# Patient Record
Sex: FEMALE | Race: WHITE | NOT HISPANIC OR LATINO | Employment: OTHER | URBAN - METROPOLITAN AREA
[De-identification: names, ages, dates, MRNs, and addresses within clinical notes are randomized per-mention and may not be internally consistent; named-entity substitution may affect disease eponyms.]

---

## 2023-08-16 ENCOUNTER — PATIENT OUTREACH (OUTPATIENT)
Dept: CARE COORDINATION | Facility: CLINIC | Age: 83
End: 2023-08-16
Payer: COMMERCIAL

## 2023-11-06 DIAGNOSIS — G25.0 ESSENTIAL TREMOR: Primary | ICD-10-CM

## 2023-11-06 RX ORDER — PRIMIDONE 50 MG/1
25 TABLET ORAL 2 TIMES DAILY
COMMUNITY
Start: 2023-10-06 | End: 2023-11-06 | Stop reason: SDUPTHER

## 2023-11-06 RX ORDER — PRIMIDONE 50 MG/1
25 TABLET ORAL 2 TIMES DAILY
Qty: 30 TABLET | Refills: 5 | Status: SHIPPED | OUTPATIENT
Start: 2023-11-06 | End: 2024-05-08 | Stop reason: SDUPTHER

## 2023-11-19 NOTE — PROGRESS NOTES
[facility]     Subjective     Jenise Castorena is an 83 y.o. year old right-handed woman with parkinsonism (PD vs. medication-induced parkinsonism vs vascular parkinsonism vs mixed) who presents for follow up. She is accompanied by Rosaline [phone]), /[place] .     Interval Hx: Last seen 5/10/23. Plan was to increase Sinemet to 2-2.5 tabs QID.      She is currently taking 2.5 tabs TID. Tolerating without side effects, no dyskinesia. She endorses significant improvement of tardive dyskinesia since last visit (when Artane was stopped), it rarely happens now.     Patient and  notice that her tremor, bradykinesia and gait is better on the current dose. She is able to walk further and has less FOG. Still continues to get home PT twice a week.     Fell once in August, had CT head that was negative but she needed stitches    No urinary incontinence  No constipation, takes laxative  No dysphagia; eating well   No OH    Patient's main complaint today is insomnia. She has trouble falling asleep but this has been chronic since before parkinsonism. She sees a psychiatrist who prescribed mirtazapine and she has follow up soon.     Social Hx:   Lives at home alone, had an aid who visits 2-3 times per day  Son visits her twice a week    HPI (per chart review):  Initially presented to movement clinic for parkinsonism. Initially patient of Dr. Parr in 11/2023 for LUE tremor/weakness and frequent falls/gait instability. Initially thought LUE weakness was from 2020 fall with left wrist fracture. Initially tried C/L TID without improvement; switched to Primidone. However, after stopping C/L she felt weaker and slower overall and was restarted on C/L, Primidone and Pramipexole was started.     Review of Systems  All other system have been reviewed and are negative for complaint.    There is no problem list on file for this patient.    No past medical history on  file.    Current Outpatient Medications:     cephalexin (Keflex) 500 mg capsule, TAKE 1 CAPSULE BY MOUTH TWO TIMES A DAY, Disp: 14 capsule, Rfl: 0    naproxen (Naprosyn) 250 mg tablet, TAKE 1 TABLET BY MOUTH TWO TIMES A DAY, Disp: 14 tablet, Rfl: 0    primidone (Mysoline) 50 mg tablet, Take 0.5 tablets (25 mg) by mouth 2 times a day., Disp: 30 tablet, Rfl: 5  Not on File    There were no vitals taken for this visit.     Objective   Physical Exam  Alert, oriented to self, year, location  EOM full range horizontally and vertically, smile symmetric  Strength 5/5 in all extremities distally and proximally except left wrist and fingers (extensor contraction)  Gait: stooped posture, narrow based gait with mild en bloc turn. Positive pull test, 3 steps back.     GAIT: See MDS-UPDRS motor examination  MDS UPDRS 1st Score: Motor Examination  Is the patient on medication for treating the symptoms of Parkinson's Disease?: Yes  Patients receiving medication for treating the symptoms of Parkinson's Disease, vikas the patient's clinical state.: On  Is the patient on Levodopa?: Yes  Minutes since last Levodopa dose: 3.5 hours ago  Speech: 0  Facial Expression: 1  Rigidty Neck: 1  Rigidty RUE: 1  Rigidity - LUE: 0  Rigidity RLE: 3  Rigidity LLE: 3  Finger Tapping Right Hand: 2  Finger Tapping Left Hand: 4  Hand Movements- Right Hand: 1  Hand Movements- Left Hand: 4  Pronatiaon-Supination Movments - Right Hand: 1  Pronatiaon-Supination Movments Left Hand: 3  Toe Tapping Right Foot: 1  Toe Tapping - Left Foot: 1  Leg Agility - Right Le  Leg Agility - Left le  Arising from Chair: 4  Gait: 2  Freezing of Gait: 1  Postural Stability: 1  Posture: 2  Global Spontanteity of Movment ( Body Bradykinesia): 1  Postural Tremor - Right Hand: 1  Postural Tremor - Left hand: 2  Kinetic Tremor - Right hand: 1  Kinetic Tremor - Left hand: 3  Rest Tremor Amplitude - RUE: 0  Rest Tremor Amplitude - LUE: 3  Rest Tremor Amplitude - RLE: 0  Rest  Tremor Amplitude - LLE: 0  Rest Tremor Amplitude - Lip/Jaw: 1  Constancy of Rest Tremor: 1  MDS UPDRS Total Score: 52  Were dyskinesias (chorea or dystonia) present during examination?: Yes  Did these movements interfere with your rating?: No      Assessment/Plan   This pleasant 83 y.o. year old female presents for follow up of parkinsonism (PD vs. medication-induced parkinsonism vs vascular parkinsonism vs mixed). Exam today shows some improvement in bradykinesia, postural stability and gait. Intermittent orobuccal dyskinesia today but patient says it is much improved since last visit. She has not had Catarino scan before, but Carbidopa-Levodopa seems to be helping her, suggesting possible iPD. MRI brain from 2022 reviewed: she has some white matter disease but no basal ganglia lesions. At this time, patient would like to try higher doses of medication.     Plan:  -Increase Carbidopa-Levodopa 2.5 tablets 4 times a day for one week, then increase to 3 tablets 4 times a day  -Take every 3-3.5 hours, avoid close to bedtime  -Follow up PCP or psychiatrist for insomnia    Return to clinic in 4-6 months

## 2023-11-20 RX ORDER — ACETAMINOPHEN AND CODEINE PHOSPHATE 300; 30 MG/1; MG/1
TABLET ORAL
COMMUNITY
Start: 2020-06-16

## 2023-11-20 RX ORDER — VENLAFAXINE HYDROCHLORIDE 150 MG/1
150 CAPSULE, EXTENDED RELEASE ORAL DAILY
COMMUNITY

## 2023-11-20 RX ORDER — LOSARTAN POTASSIUM AND HYDROCHLOROTHIAZIDE 25; 100 MG/1; MG/1
TABLET ORAL
COMMUNITY
Start: 2020-05-06

## 2023-11-20 RX ORDER — ATORVASTATIN CALCIUM 40 MG/1
40 TABLET, FILM COATED ORAL DAILY
COMMUNITY

## 2023-11-20 RX ORDER — CEFDINIR 300 MG/1
CAPSULE ORAL
COMMUNITY
Start: 2023-03-16

## 2023-11-20 RX ORDER — TRAZODONE HYDROCHLORIDE 50 MG/1
50-100 TABLET ORAL NIGHTLY PRN
COMMUNITY

## 2023-11-20 RX ORDER — LYSINE HCL 500 MG
1 TABLET ORAL 2 TIMES DAILY
COMMUNITY
Start: 2019-11-11

## 2023-11-20 RX ORDER — CLONIDINE 0.3 MG/24H
PATCH, EXTENDED RELEASE TRANSDERMAL
COMMUNITY

## 2023-11-20 RX ORDER — LOSARTAN POTASSIUM 50 MG/1
TABLET ORAL
COMMUNITY
Start: 2020-07-14

## 2023-11-20 RX ORDER — PREGABALIN 50 MG/1
1 CAPSULE ORAL 3 TIMES DAILY
COMMUNITY
Start: 2016-05-24

## 2023-11-20 RX ORDER — LEVOTHYROXINE SODIUM 75 UG/1
75 TABLET ORAL DAILY
COMMUNITY
Start: 2023-10-09

## 2023-11-20 RX ORDER — NITROFURANTOIN 25; 75 MG/1; MG/1
CAPSULE ORAL
COMMUNITY
Start: 2021-03-20

## 2023-11-20 RX ORDER — PRAMIPEXOLE DIHYDROCHLORIDE 0.12 MG/1
0.12 TABLET ORAL NIGHTLY
COMMUNITY
End: 2024-05-21

## 2023-11-20 RX ORDER — POLYVINYL ALCOHOL 14 MG/ML
SOLUTION/ DROPS OPHTHALMIC
COMMUNITY
Start: 2020-01-31

## 2023-11-20 RX ORDER — ACETAMINOPHEN 500 MG
TABLET ORAL
COMMUNITY

## 2023-11-20 RX ORDER — HYDROCHLOROTHIAZIDE 25 MG/1
25 TABLET ORAL DAILY
COMMUNITY

## 2023-11-20 RX ORDER — CHLORHEXIDINE GLUCONATE ORAL RINSE 1.2 MG/ML
SOLUTION DENTAL
COMMUNITY
Start: 2023-10-30

## 2023-11-20 RX ORDER — ESOMEPRAZOLE MAGNESIUM 40 MG/1
1 CAPSULE, DELAYED RELEASE ORAL DAILY
COMMUNITY
Start: 2016-02-22

## 2023-11-20 RX ORDER — ARIPIPRAZOLE 5 MG/1
1 TABLET ORAL DAILY
COMMUNITY
Start: 2016-05-23

## 2023-11-20 RX ORDER — MIRTAZAPINE 7.5 MG/1
7.5 TABLET, FILM COATED ORAL NIGHTLY
COMMUNITY
Start: 2023-10-17

## 2023-11-20 RX ORDER — ARIPIPRAZOLE 10 MG/1
TABLET ORAL
COMMUNITY
Start: 2020-07-14

## 2023-11-20 RX ORDER — ALBUTEROL SULFATE 90 UG/1
AEROSOL, METERED RESPIRATORY (INHALATION)
COMMUNITY
Start: 2023-02-17

## 2023-11-20 RX ORDER — IBANDRONATE SODIUM 150 MG/1
TABLET, FILM COATED ORAL
COMMUNITY

## 2023-11-20 RX ORDER — ASPIRIN 325 MG
TABLET, DELAYED RELEASE (ENTERIC COATED) ORAL
COMMUNITY
Start: 2022-12-01

## 2023-11-20 RX ORDER — QUETIAPINE FUMARATE 25 MG/1
TABLET, FILM COATED ORAL
COMMUNITY
Start: 2023-10-13

## 2023-11-20 RX ORDER — BENZONATATE 100 MG/1
100 CAPSULE ORAL 3 TIMES DAILY PRN
COMMUNITY
Start: 2023-09-27

## 2023-11-20 RX ORDER — LEVOTHYROXINE SODIUM 50 UG/1
50 TABLET ORAL DAILY
COMMUNITY

## 2023-11-20 RX ORDER — TRAMADOL HYDROCHLORIDE 50 MG/1
TABLET ORAL EVERY 8 HOURS
COMMUNITY
Start: 2016-05-24

## 2023-11-20 RX ORDER — MECLIZINE HCL 12.5 MG 12.5 MG/1
12.5 TABLET ORAL 3 TIMES DAILY
COMMUNITY
Start: 2023-01-25

## 2023-11-20 RX ORDER — DOCUSATE SODIUM 100 MG/1
CAPSULE, LIQUID FILLED ORAL
COMMUNITY
Start: 2021-04-06

## 2023-11-20 RX ORDER — MIRTAZAPINE 15 MG/1
15 TABLET, ORALLY DISINTEGRATING ORAL NIGHTLY
COMMUNITY
Start: 2023-10-13

## 2023-11-20 RX ORDER — TERBINAFINE HYDROCHLORIDE 250 MG/1
1 TABLET ORAL DAILY
COMMUNITY
Start: 2016-03-08

## 2023-11-20 RX ORDER — DICLOFENAC SODIUM 10 MG/G
GEL TOPICAL
COMMUNITY
Start: 2023-06-03

## 2023-11-20 RX ORDER — TRIHEXYPHENIDYL HYDROCHLORIDE 2 MG/1
TABLET ORAL
COMMUNITY

## 2023-11-20 RX ORDER — CARBIDOPA AND LEVODOPA 25; 100 MG/1; MG/1
TABLET ORAL
COMMUNITY
End: 2023-11-30 | Stop reason: SDUPTHER

## 2023-11-20 RX ORDER — SYRINGE-NEEDLE,INSULIN,0.5 ML 28GX1/2"
600 SYRINGE, EMPTY DISPOSABLE MISCELLANEOUS 2 TIMES DAILY
COMMUNITY
Start: 2023-08-23

## 2023-11-20 RX ORDER — ZOLPIDEM TARTRATE 10 MG/1
TABLET ORAL
COMMUNITY
Start: 2021-03-25

## 2023-11-20 RX ORDER — LANOLIN ALCOHOL/MO/W.PET/CERES
1 CREAM (GRAM) TOPICAL 2 TIMES DAILY
COMMUNITY

## 2023-11-20 RX ORDER — LABETALOL 300 MG/1
1 TABLET, FILM COATED ORAL 2 TIMES DAILY
COMMUNITY

## 2023-11-20 RX ORDER — ACETAMINOPHEN 500 MG
500 TABLET ORAL EVERY 8 HOURS PRN
COMMUNITY
Start: 2023-04-04

## 2023-11-20 RX ORDER — POLYVINYL ALCOHOL, POVIDONE .6; .5 G/100ML; G/100ML
SOLUTION/ DROPS INTRAOCULAR
COMMUNITY
Start: 2023-10-06

## 2023-11-20 RX ORDER — GABAPENTIN 100 MG/1
100 CAPSULE ORAL 3 TIMES DAILY
COMMUNITY

## 2023-11-20 RX ORDER — LIDOCAINE 50 MG/G
PATCH TOPICAL
COMMUNITY

## 2023-11-20 RX ORDER — OLMESARTAN MEDOXOMIL AND HYDROCHLOROTHIAZIDE 40/25 40; 25 MG/1; MG/1
1 TABLET ORAL DAILY
COMMUNITY
Start: 2016-02-22

## 2023-11-20 RX ORDER — DOCUSATE SODIUM AND SENNOSIDES 50; 8.6 MG/1; MG/1
1 TABLET ORAL 2 TIMES DAILY
COMMUNITY
Start: 2023-10-30

## 2023-11-20 RX ORDER — VALSARTAN 320 MG/1
320 TABLET ORAL DAILY
COMMUNITY

## 2023-11-21 ENCOUNTER — OFFICE VISIT (OUTPATIENT)
Dept: NEUROLOGY | Facility: CLINIC | Age: 83
End: 2023-11-21
Payer: COMMERCIAL

## 2023-11-21 VITALS — DIASTOLIC BLOOD PRESSURE: 66 MMHG | RESPIRATION RATE: 18 BRPM | SYSTOLIC BLOOD PRESSURE: 110 MMHG | HEART RATE: 70 BPM

## 2023-11-21 DIAGNOSIS — G20.C PARKINSONISM, UNSPECIFIED PARKINSONISM TYPE (MULTI): Primary | ICD-10-CM

## 2023-11-21 PROCEDURE — 99214 OFFICE O/P EST MOD 30 MIN: CPT | Performed by: PSYCHIATRY & NEUROLOGY

## 2023-11-21 PROCEDURE — 1036F TOBACCO NON-USER: CPT | Performed by: PSYCHIATRY & NEUROLOGY

## 2023-11-21 PROCEDURE — 1125F AMNT PAIN NOTED PAIN PRSNT: CPT | Performed by: PSYCHIATRY & NEUROLOGY

## 2023-11-21 PROCEDURE — 1160F RVW MEDS BY RX/DR IN RCRD: CPT | Performed by: PSYCHIATRY & NEUROLOGY

## 2023-11-21 PROCEDURE — 1159F MED LIST DOCD IN RCRD: CPT | Performed by: PSYCHIATRY & NEUROLOGY

## 2023-11-21 RX ORDER — AMMONIUM LACTATE 12 G/100G
CREAM TOPICAL
COMMUNITY
Start: 2023-06-02

## 2023-11-21 RX ORDER — SENNOSIDES 8.6 MG/1
8.6 TABLET ORAL AS NEEDED
COMMUNITY
Start: 2023-08-16

## 2023-11-21 RX ORDER — POLYETHYLENE GLYCOL 3350 17 G/17G
POWDER, FOR SOLUTION ORAL
COMMUNITY
Start: 2021-12-27

## 2023-11-21 RX ORDER — CARBOXYMETHYLCELLULOSE SODIUM 5 MG/ML
1 SOLUTION/ DROPS OPHTHALMIC 4 TIMES DAILY
COMMUNITY
Start: 2020-01-31

## 2023-11-21 ASSESSMENT — ENCOUNTER SYMPTOMS
DEPRESSION: 0
OCCASIONAL FEELINGS OF UNSTEADINESS: 0
LOSS OF SENSATION IN FEET: 0

## 2023-11-21 ASSESSMENT — UNIFIED PARKINSONS DISEASE RATING SCALE (UPDRS)
PRONATION_SUPINATION_LEFT: 3
GAIT: 2
CLINICAL_STATE: ON
MOVEMENTS_INTERFERE_WITH_RATINGS: NO
RIGIDITY_RUE: 1
LEVODOPA: YES
AMPLITUDE_LUE: 3
HANDMOVEMENTS_RIGHT: 1
AMPLITUDE_LIP_JAW: 1
LEG_AGILITY_RIGHT: 1
TOETAPPING_RIGHT: 1
PRONATION_SUPINATION_RIGHT: 1
CONSTANCY_TREMOR_ATREST: 1
TOTAL_SCORE: 52
FINGER_TAPPING_LEFT: 4
KINETIC_TREMOR_LEFTHAND: 3
AMPLITUDE_RLE: 0
CHAIR_RISING_SCALE: 4
POSTURAL_TREMOR_RIGHTHAND: 1
AMPLITUDE_RUE: 0
POSTURAL_STABILITY: 1
RIGIDITY_LUE: 0
DYSKINESIAS_PRESENT: YES
POSTURE: 2
RIGIDITY_LLE: 3
SPEECH: 0
FINGER_TAPPING_RIGHT: 2
RIGIDITY_NECK: 1
POSTURAL_TREMOR_LEFTHAND: 2
PARKINSONS_MEDS: YES
FREEZING_GAIT: 1
LEG_AGILITY_LEFT: 2
KINETIC_TREMOR_RIGHTHAND: 1
TOETAPPING_LEFT: 1
RIGIDITY_RLE: 3
SPONTANEITY_OF_MOVEMENT: 1
FACIAL_EXPRESSION: 1
AMPLITUDE_LLE: 0

## 2023-11-21 ASSESSMENT — PATIENT HEALTH QUESTIONNAIRE - PHQ9
1. LITTLE INTEREST OR PLEASURE IN DOING THINGS: NOT AT ALL
2. FEELING DOWN, DEPRESSED OR HOPELESS: NOT AT ALL
SUM OF ALL RESPONSES TO PHQ9 QUESTIONS 1 AND 2: 0

## 2023-11-21 NOTE — PATIENT INSTRUCTIONS
Dear Romelia Castorena,    You were seen by Dr. Bond for parkinsonism. Since the Carbidopa-Levodopa is helping you but you feel you can use more medication, we will increase the amount of Carbidopa-Levodopa you take.    Please take Carbidopa-Levodopa 2.5 tablets 4 times a day for one week, then increase to 3 tablets 4 times a day.  We recommend taking the meds every 3-3.5 hours; for example: [id number].   Try to avoid taking the last dose right before bed as it could contribute to insomnia.   We recommend you work with your PCP or psychiatrist for insomnia.     Follow up in 6 months  Please call is if there are any questions about dose adjustment

## 2023-11-29 DIAGNOSIS — G20.A1 PARKINSON'S DISEASE, UNSPECIFIED WHETHER DYSKINESIA PRESENT, UNSPECIFIED WHETHER MANIFESTATIONS FLUCTUATE (MULTI): Primary | ICD-10-CM

## 2023-11-29 NOTE — TELEPHONE ENCOUNTER
Call from Home care RN Bina [phone] calls asking for clarification on C/L increase you ordered. The home care nurse says that the patient was only on 2 tabs 3x daily and she feels the patietn cannot jump to 2.5 pills 4x/d and then 3 pill 4x/d in just 2 weeks. She also says that it is physically impossible to get her 4x/d dosing as staff is not available q3-3.5 hours. Please contact this RN at [phone] as she was quite forceful in how she felt the meds should be taken and wanted an alternate plan that fit their scheduling. You should probably designate the times you want the meds given in the RX so they cannot change the dose schedule?

## 2023-11-30 DIAGNOSIS — G20.A1 PARKINSON'S DISEASE, UNSPECIFIED WHETHER DYSKINESIA PRESENT, UNSPECIFIED WHETHER MANIFESTATIONS FLUCTUATE (MULTI): ICD-10-CM

## 2023-11-30 RX ORDER — CARBIDOPA AND LEVODOPA 25; 100 MG/1; MG/1
3 TABLET ORAL 3 TIMES DAILY
Qty: 810 TABLET | Refills: 3 | Status: SHIPPED | OUTPATIENT
Start: 2023-11-30 | End: 2024-11-29

## 2023-11-30 RX ORDER — CARBIDOPA AND LEVODOPA 25; 100 MG/1; MG/1
3 TABLET ORAL 3 TIMES DAILY
Qty: 810 TABLET | Refills: 3 | Status: SHIPPED | OUTPATIENT
Start: 2023-11-30 | End: 2023-11-30 | Stop reason: SDUPTHER

## 2023-11-30 NOTE — TELEPHONE ENCOUNTER
I called the home care RN and passed along recommendation from Dr Bond and put in new refill for MD to signoff.

## 2023-12-06 ENCOUNTER — APPOINTMENT (OUTPATIENT)
Dept: NEUROLOGY | Facility: CLINIC | Age: 83
End: 2023-12-06
Payer: COMMERCIAL

## 2023-12-12 ENCOUNTER — TELEPHONE (OUTPATIENT)
Dept: NEUROLOGY | Facility: CLINIC | Age: 83
End: 2023-12-12
Payer: COMMERCIAL

## 2023-12-12 NOTE — TELEPHONE ENCOUNTER
Pt  home care nurse weekly update calls to say that the pt was unable to tolerate increased C/L- she passed out and fell onSaturday - did not seek urgent care. They are changinging her dose back to 2 tabs tid.

## 2024-05-08 DIAGNOSIS — G25.0 ESSENTIAL TREMOR: ICD-10-CM

## 2024-05-08 RX ORDER — PRIMIDONE 50 MG/1
25 TABLET ORAL 2 TIMES DAILY
Qty: 30 TABLET | Refills: 5 | Status: SHIPPED | OUTPATIENT
Start: 2024-05-08 | End: 2024-05-21 | Stop reason: ALTCHOICE

## 2024-05-21 ENCOUNTER — OFFICE VISIT (OUTPATIENT)
Dept: NEUROLOGY | Facility: CLINIC | Age: 84
End: 2024-05-21
Payer: COMMERCIAL

## 2024-05-21 VITALS
HEIGHT: 62 IN | SYSTOLIC BLOOD PRESSURE: 131 MMHG | DIASTOLIC BLOOD PRESSURE: 84 MMHG | HEART RATE: 58 BPM | RESPIRATION RATE: 16 BRPM | WEIGHT: 160 LBS | BODY MASS INDEX: 29.44 KG/M2

## 2024-05-21 DIAGNOSIS — G20.C PARKINSONISM, UNSPECIFIED PARKINSONISM TYPE (MULTI): Primary | ICD-10-CM

## 2024-05-21 PROCEDURE — 99212 OFFICE O/P EST SF 10 MIN: CPT | Performed by: NURSE PRACTITIONER

## 2024-05-21 PROCEDURE — 1160F RVW MEDS BY RX/DR IN RCRD: CPT | Performed by: NURSE PRACTITIONER

## 2024-05-21 PROCEDURE — 1159F MED LIST DOCD IN RCRD: CPT | Performed by: NURSE PRACTITIONER

## 2024-05-21 PROCEDURE — 1125F AMNT PAIN NOTED PAIN PRSNT: CPT | Performed by: NURSE PRACTITIONER

## 2024-05-21 PROCEDURE — 1036F TOBACCO NON-USER: CPT | Performed by: NURSE PRACTITIONER

## 2024-05-21 ASSESSMENT — PATIENT HEALTH QUESTIONNAIRE - PHQ9
5. POOR APPETITE OR OVEREATING: NOT AT ALL
SUM OF ALL RESPONSES TO PHQ9 QUESTIONS 1 AND 2: 4
7. TROUBLE CONCENTRATING ON THINGS, SUCH AS READING THE NEWSPAPER OR WATCHING TELEVISION: MORE THAN HALF THE DAYS
8. MOVING OR SPEAKING SO SLOWLY THAT OTHER PEOPLE COULD HAVE NOTICED. OR THE OPPOSITE, BEING SO FIGETY OR RESTLESS THAT YOU HAVE BEEN MOVING AROUND A LOT MORE THAN USUAL: NOT AT ALL
4. FEELING TIRED OR HAVING LITTLE ENERGY: NEARLY EVERY DAY
2. FEELING DOWN, DEPRESSED OR HOPELESS: SEVERAL DAYS
9. THOUGHTS THAT YOU WOULD BE BETTER OFF DEAD, OR OF HURTING YOURSELF: NOT AT ALL
10. IF YOU CHECKED OFF ANY PROBLEMS, HOW DIFFICULT HAVE THESE PROBLEMS MADE IT FOR YOU TO DO YOUR WORK, TAKE CARE OF THINGS AT HOME, OR GET ALONG WITH OTHER PEOPLE: VERY DIFFICULT
6. FEELING BAD ABOUT YOURSELF - OR THAT YOU ARE A FAILURE OR HAVE LET YOURSELF OR YOUR FAMILY DOWN: NOT AT ALL
1. LITTLE INTEREST OR PLEASURE IN DOING THINGS: NEARLY EVERY DAY
SUM OF ALL RESPONSES TO PHQ QUESTIONS 1-9: 11
3. TROUBLE FALLING OR STAYING ASLEEP OR SLEEPING TOO MUCH: MORE THAN HALF THE DAYS

## 2024-05-21 ASSESSMENT — ENCOUNTER SYMPTOMS
OCCASIONAL FEELINGS OF UNSTEADINESS: 1
DEPRESSION: 1
LOSS OF SENSATION IN FEET: 0

## 2024-05-21 ASSESSMENT — PAIN SCALES - GENERAL: PAINLEVEL: 5

## 2024-05-21 NOTE — PROGRESS NOTES
"Subjective     Jenise Castorena is a 83 y.o. year old female who presents with No chief complaint on file., here for follow up visit.    HPI  Comes with  Asmita who translates.     Last visit with Dr. Bond 11/21/23.    Also hospitalized in March with sepsis/flu, UTI, delirium, CT head reported as negative.  \" We discontinued gabapentin and mirtazapine as well as decreased seroquel from 50 mg to 25 mg at bedtime. She became more alert, and has since been A&Ox3\"  \"Additionally the Parkinson's neurology team recommended: Simament (carbidopa-levodopa) was changed from 3 tablets 3 times per day to 2 tablets 4 times per day. Continue the same dose of primidone 25 mg twice daily.\"    She did not tolerated QID dosing Sinemet, could not stand on her feet/falling.     Sinemet increased to 2.5tabs TID and had lip smacking/dyskinesia, decreased to 2 tabs TID and ropinirole 1/2 tab BID    Severe back pain is her main concern pending pain management injection, has had one already and is wearing off.     Wheelchair was difficult for her today, most of the time in bed and more comfortable. Able to feed herself.       MOTOR SYMPTOMS      +/-                            Comments  Motor sx overall  Tremor and weakness are her main issues   Tremor + Improved w/ meds  Not as bad or as bothersome   Rigidity +    Bradykinesia +    Balance/gait + Does not walk much d/t back pain, mostly in bed   Falls -    PT + Aid or PT walks her, does not walk   Exercise -      NON MOTOR SYMPTOMS       +/-                               Comments  Orthostatic lightheadedness  -    Cognitive + Sometimes   Insomnia +    RBD     Depression/mood + No longer on Abilify  thinks  Just Remeron  Follows w/ psych  Sleeps a lot   Hypophonia +    Dysphagia -    Constipation + Q1-2days   Urinary dysfunction + IC sometimes        Social  Lives with: alone, aid 2x/day daily  Help with ADLs: Needs assist bathing, dressing, nurse does meds " qweek  Chronic weakness and L hand frx-unable to do anything with it         [facility] Meds  Med Dose Time   Carbidopa-levodopa 25/100mg 2.5tabs TID and had lip smacking/dyskinesia, decreased to 2 tabs TID  8am-2pm-7pm             Latency     Wearing off     Side effects     Dyskinesia None at lower dose Sinemet    Hallucinations None recently    Impulse control None    Sudden sleep None    Other       Prior medications:  She endorses significant improvement of tardive dyskinesia since last visit (when Artane was stopped), it rarely happens now.   Primidone  Pramipexole    Pertinent testing:      Patient Health Questionnaire-2 Score: 4  Patient Health Questionnaire-9 Score: 11         Current Outpatient Medications:     acetaminophen (Tylenol) 500 mg tablet, Take 1 tablet (500 mg) by mouth every 8 hours if needed., Disp: , Rfl:     ammonium lactate (Amlactin) 12 % cream, Apply topically., Disp: , Rfl:     ARIPiprazole (Abilify) 10 mg tablet, Take by mouth., Disp: , Rfl:     ARIPiprazole (Abilify) 5 mg tablet, Take 1 tablet (5 mg) by mouth once daily., Disp: , Rfl:     Artificial Tears,pvalch-povid, 0.5-0.6 % ophthalmic solution, INSTILL 1 DROP IN EACH EYE FOUR TIMES DAILY, Disp: , Rfl:     atorvastatin (Lipitor) 40 mg tablet, Take 1 tablet (40 mg) by mouth once daily., Disp: , Rfl:     calcium carbonate-vit D3-min 600 mg calcium- 400 unit tablet, Take 1 tablet by mouth 2 times a day., Disp: , Rfl:     carbidopa-levodopa (Sinemet)  mg tablet, Take 3 tablets by mouth 3 times a day. (Patient taking differently: Take 3 tablets by mouth 2 times a day.), Disp: 810 tablet, Rfl: 3    carboxymethylcellulose (Refresh Tears) 0.5 % ophthalmic solution, Administer 1 drop into affected eye(s) 4 times a day., Disp: , Rfl:     cholecalciferol (Vitamin D-3) 50,000 unit capsule, TAKE ONE CAPSULE BY MOUTH TWICE A WEEK, Disp: , Rfl:     diclofenac sodium (Voltaren) 1 % gel gel, , Disp: , Rfl:     diclofenac sodium  1 % kit, Place on the skin., Disp: , Rfl:     esomeprazole (NexIUM) 40 mg DR capsule, Take 1 capsule (40 mg) by mouth once daily., Disp: , Rfl:     gabapentin (Neurontin) 100 mg capsule, Take 1 capsule (100 mg) by mouth 3 times a day., Disp: , Rfl:     hydroCHLOROthiazide (HYDRODiuril) 25 mg tablet, Take 1 tablet (25 mg) by mouth once daily., Disp: , Rfl:     levothyroxine (Synthroid, Levoxyl) 50 mcg tablet, Take 1 tablet (50 mcg) by mouth once daily., Disp: , Rfl:     levothyroxine (Synthroid, Levoxyl) 75 mcg tablet, Take 1 tablet (75 mcg) by mouth once daily., Disp: , Rfl:     losartan (Cozaar) 50 mg tablet, Take by mouth., Disp: , Rfl:     losartan-hydrochlorothiazide (Hyzaar) 100-25 mg tablet, Take by mouth., Disp: , Rfl:     meclizine (Antivert) 12.5 mg tablet, Take 1 tablet (12.5 mg) by mouth 3 times a day., Disp: , Rfl:     melatonin 5 mg tablet, Take by mouth once daily., Disp: , Rfl:     mirtazapine (Remeron Sol-Tab) 15 mg disintegrating tablet, Take 1 tablet (15 mg) by mouth once daily at bedtime., Disp: , Rfl:     naproxen (Naprosyn) 250 mg tablet, TAKE 1 TABLET BY MOUTH TWO TIMES A DAY, Disp: 14 tablet, Rfl: 0    nitrofurantoin, macrocrystal-monohydrate, (Macrobid) 100 mg capsule, Take by mouth., Disp: , Rfl:     Oyster Shell Calcium-Vit D3 500 mg-5 mcg (200 unit) tablet, Take 1 tablet by mouth 2 times a day., Disp: , Rfl:     sennosides (Senokot) 8.6 mg tablet, Take 1 tablet (8.6 mg) by mouth if needed., Disp: , Rfl:     valsartan (Diovan) 320 mg tablet, Take 1 tablet (320 mg) by mouth once daily., Disp: , Rfl:     acetaminophen-codeine (Tylenol w/ Codeine #3) 300-30 mg tablet, Take by mouth., Disp: , Rfl:     albuterol 90 mcg/actuation inhaler, inhale TWO puffs as instructed EVERY 6 HOURS AS NEEDED, Disp: , Rfl:     benzonatate (Tessalon) 100 mg capsule, Take 1 capsule (100 mg) by mouth 3 times a day as needed., Disp: , Rfl:     cefdinir (Omnicef) 300 mg capsule, TAKE ONE CAPSULE BY MOUTH TWICE DAILY  FOR 10 DAYS, Disp: , Rfl:     cephalexin (Keflex) 500 mg capsule, TAKE 1 CAPSULE BY MOUTH TWO TIMES A DAY (Patient not taking: Reported on 5/21/2024), Disp: 14 capsule, Rfl: 0    chlorhexidine (Peridex) 0.12 % solution, 1 TABLESPOON SWISH IN MOUTH FOR 30 SECONDS TWO TIMES DAILY, Disp: , Rfl:     cloNIDine (Catapres-TTS) 0.3 mg/24 hr patch, apply 1 PATCH topically AS DIRECTED one time -a WEEK, Disp: , Rfl:     docusate sodium (DOK) 100 mg capsule, Take by mouth., Disp: , Rfl:     ibandronate (Boniva) 150 mg tablet, take one tablet by monthly in the morning with glass of water on empty stomach. do not lay or sit in 30 minutes, Disp: , Rfl:     labetalol (Normodyne) 300 mg tablet, Take 1 tablet (300 mg) by mouth 2 times a day., Disp: , Rfl:     lidocaine (Lidoderm) 5 % patch, USE AS DIRECTED PER PACKAGE INSTRUCTIONS. DO NOT USE MORE THAN 1 PATCH EVERY 12 TO 24 HOURS., Disp: , Rfl:     mirtazapine (Remeron) 7.5 mg tablet, Take 1 tablet (7.5 mg) by mouth once daily at bedtime., Disp: , Rfl:     Mucus Relief  mg 12 hr tablet, Take 1 tablet (600 mg) by mouth 2 times a day., Disp: , Rfl:     olmesartan-hydrochlorothiazide (Benicar HCT) 40-25 mg tablet, Take 1 tablet by mouth once daily., Disp: , Rfl:     polyethylene glycol (Glycolax, Miralax) 17 gram/dose powder, Dissolve dose in 4 - 8 ounces of liquid and take as directed., Disp: , Rfl:     polyvinyl alcohol (Liquifilm Tears) 1.4 % ophthalmic solution, Administer into affected eye(s)., Disp: , Rfl:     pramipexole (Mirapex) 0.125 mg tablet, Take 1 tablet (0.125 mg) by mouth once daily at bedtime., Disp: , Rfl:     pregabalin (Lyrica) 50 mg capsule, Take 1 capsule (50 mg) by mouth 3 times a day., Disp: , Rfl:     primidone (Mysoline) 50 mg tablet, Take 0.5 tablets (25 mg) by mouth 2 times a day. (Patient not taking: Reported on 5/21/2024), Disp: 30 tablet, Rfl: 5    QUEtiapine (SEROquel) 25 mg tablet, TAKE 1/2 TABLET BY MOUTH AT BEDTIME - if WELL tolerated MAY  "increase TO 1 tablet AT BEDTIME, Disp: , Rfl:     Stool Softener-Stimulant Laxat 8.6-50 mg tablet, Take 1 tablet by mouth 2 times a day., Disp: , Rfl:     terbinafine (LamISIL) 250 mg tablet, Take 1 tablet (250 mg) by mouth once daily., Disp: , Rfl:     traMADol (Ultram) 50 mg tablet, Take by mouth every 8 hours., Disp: , Rfl:     traZODone (Desyrel) 50 mg tablet, Take 1-2 tablets ( mg) by mouth as needed at bedtime., Disp: , Rfl:     trihexyphenidyl (Artane) 2 mg tablet, TAKE ONE TABLET BY MOUTH once daily EVERY MORNING, Disp: , Rfl:     venlafaxine XR (Effexor-XR) 150 mg 24 hr capsule, Take 1 capsule (150 mg) by mouth once daily., Disp: , Rfl:     vortioxetine (Trintellix) 10 mg tablet tablet, Take by mouth., Disp: , Rfl:     zolpidem (Ambien) 10 mg tablet, Take by mouth., Disp: , Rfl:        Objective   Vitals:    05/21/24 1004   BP: 131/84   BP Location: Right arm   Patient Position: Lying   BP Cuff Size: Adult   Pulse: 58   Resp: 16   Weight: 72.6 kg (160 lb)   Height: 1.575 m (5' 2\")         Patient Health Questionnaire-9 Score: 11         Physical Exam  Oriented to time, place, person  Unable to fully assess d/t pain, she is moaning, she has paratonia in all limbs, needs assist to get from table to wheelchair        Assessment/Plan   Ms. Jenise Castorena is a 83 y.o. F with Afib, seen in follow up for parkinsonism (PD vs. medication-induced parkinsonism vs vascular parkinsonism vs mixed). Exam is difficult today as she is having a lot of back pain, laying on exam table, moaning, due for injection with pain management and being up in wheelchair exacerbated pain (usually lays in bed all day without issue per ). She was unable to tolerate higher doses of Sinemet d/t mouth dyskinesia and also falls at QID dosing, now tolerating current dose without dyskinesia. Patient does not want to adjust meds at this time as back pain is her main issue.     Last visit noted some improvement in bradykinesia, " "postural stability and gait.  She has not had Catarino scan before, but Carbidopa-Levodopa seems to be helping her, suggesting possible iPD. MRI brain from 2022 reviewed by Dr. Bond and he noted \"she has some white matter disease but no basal ganglia lesions\"    PLAN  Continue Sinemet unchanged  Would change to Rytary next if she wanted to adjust meds to help avoid dyskinesia and falls she she previously had at higher dose.        Diagnoses and all orders for this visit:  Parkinsonism, unspecified Parkinsonism type (Multi)        #Continue   Med Dose Time   Carbidopa-levodopa 25/100mg 2 tabs 3 times a day 8am-2pm-7pm     #If tremor worsens or stiffness or slowness worsen or become bothersome, we can change to Rytary (so hopefully more benefit with less mouth movement), as we discussed. Dose would need monitored and adjusted with change.    #Please send me updated medication list (fax- [phone])    #6M follow up with Dr. Bond, see me sooner if needed (OK for virtual)      Jomar Austin, NP-C  Adult/Gerontological Nurse Practitioner   [facility], Department of Neurology  [facility] [place]  [facility]  [address] [id number]  Phone: [phone]  Fax: [phone]  "

## 2024-05-21 NOTE — PATIENT INSTRUCTIONS
#Continue   Med Dose Time   Carbidopa-levodopa 25/100mg 2tabs 3 times a day 8am-2pm-7pm     #If tremor worsens or stiffness or slowness worsen or become bothersome, we can change to Rytary (so hopefully more benefit with less mouth movement), as we discussed. Dose would need monitored and adjusted with change.    #Please send me updated medication list (fax- [phone])    #6M follow up with Dr. Bond, see me sooner if needed (OK for virtual)    Jomar Austin, NP-C  Adult/Gerontological Nurse Practitioner   [facility], Department of Neurology  [facility] [place]  [facility]  [address] [place] [address]  Phone: [phone]  Fax: [phone]

## 2024-11-22 ENCOUNTER — APPOINTMENT (OUTPATIENT)
Dept: NEUROLOGY | Facility: CLINIC | Age: 84
End: 2024-11-22
Payer: COMMERCIAL

## 2024-11-26 ENCOUNTER — APPOINTMENT (OUTPATIENT)
Dept: NEUROLOGY | Facility: CLINIC | Age: 84
End: 2024-11-26
Payer: COMMERCIAL

## 2024-11-26 NOTE — PROGRESS NOTES
"Subjective     Jenise Castorena is a 84 y.o. year old female who presents with No chief complaint on file., here for follow up visit.    ERROR----NOT A VISIT, patient's  was supposed to be with her and is not, given the # to nurse to call and she is driving and also will not be with patient. Instructed nurse to reschedule visit when someone can be with her.      HPI  With  Asmita who translates.     She has difficulty getting to apts and is mostly home bound.     Last visit May 2024 with me and no changes to meds.     10/27/24 admitted with pneu    acute hypoxemic respiratory failure in setting of aspiration pneumonia, sepsis--was on the vent  Afib and anticoag deffered d/t melena as well as falls risk  Also had melena, needs GA for EGD and son declined      6/30/24 admitted for syncope and neuro consult was as follows: \"IMPRESSION: Clinical scenario is suggestive of syncope related to hypotension (68/45) without tachycardia, Bps with fluctuation up to 234/100 suggestive of autonomic dysfunction related to PD. Mild electrolyte abnormaities may suggest alternative cause to hypotension such as reduced PO intake     RECOMMENDATIONS/CARE PLAN:  Continue PTA Carbidopa-levodopa 25/100mg 2tabs 3 times a day 8am-2pm-7pm   Follow up with his PD specialist at . Would recommend they call their office to inform them, should any PD medication changes be recommended at this time.   Agree with work up of other causes of hypotension   Check orthostatics   Avoid hypotension inducing medications as able   Nonpharmacologic steps for autonomic dysfunction include increased fluid and salt intake, compression stockings, physical maneuvers (eg, abdominal binders), and exercise. May keep HOB to 45 degrees if hypertensive to >200 upon laying.   May consider autonomic dysfunction evaluation on outpatient basis if symptoms fail to respond to conservative treatment    Will follow the results of recommended tests " "peripherally. No further neurological testing recommended at this time. Neurology will sign off. Please do not hesitate to contact us via the neurology consult pager [phone] if further questions arise.\"        MOTOR SYMPTOMS      +/-                            Comments  Motor sx overall  Tremor and weakness are her main issues   Tremor + Improved w/ meds  Not as bad or as bothersome   Rigidity +    Bradykinesia +    Balance/gait + Does not walk much d/t back pain, mostly in bed   Falls -    PT + Aid or PT walks her, does not walk   Exercise -      NON MOTOR SYMPTOMS       +/-                               Comments  Orthostatic lightheadedness  -    Cognitive + Sometimes   Insomnia +    RBD     Depression/mood + No longer on Abilify  thinks  Just Remeron  Follows w/ psych  Sleeps a lot   Hypophonia +    Dysphagia -    Constipation + Q1-2days   Urinary dysfunction + IC sometimes        Social  Lives with: alone, aid 2x/day daily  Help with ADLs: Needs assist bathing, dressing, nurse does meds qweek  Chronic weakness and L hand frx-unable to do anything with it         [facility] Meds  Med Dose Time   Carbidopa-levodopa 25/100mg 2.5tabs TID and had lip smacking/dyskinesia, decreased to 2 tabs TID  8am-2pm-7pm             Latency     Wearing off     Side effects     Dyskinesia None at lower dose Sinemet    Hallucinations None recently    Impulse control None    Sudden sleep None    Other       Prior medications:  She endorses significant improvement of tardive dyskinesia since last visit (when Artane was stopped), it rarely happens now.   Primidone  Pramipexole    Pertinent testing:                   Current Outpatient Medications:     acetaminophen (Tylenol) 500 mg tablet, Take 1 tablet (500 mg) by mouth every 8 hours if needed., Disp: , Rfl:     acetaminophen-codeine (Tylenol w/ Codeine #3) 300-30 mg tablet, Take by mouth., Disp: , Rfl:     albuterol 90 mcg/actuation inhaler, inhale TWO puffs " as instructed EVERY 6 HOURS AS NEEDED, Disp: , Rfl:     ammonium lactate (Amlactin) 12 % cream, Apply topically., Disp: , Rfl:     ARIPiprazole (Abilify) 10 mg tablet, Take by mouth., Disp: , Rfl:     ARIPiprazole (Abilify) 5 mg tablet, Take 1 tablet (5 mg) by mouth once daily., Disp: , Rfl:     Artificial Tears,pvalch-povid, 0.5-0.6 % ophthalmic solution, INSTILL 1 DROP IN EACH EYE FOUR TIMES DAILY, Disp: , Rfl:     atorvastatin (Lipitor) 40 mg tablet, Take 1 tablet (40 mg) by mouth once daily., Disp: , Rfl:     benzonatate (Tessalon) 100 mg capsule, Take 1 capsule (100 mg) by mouth 3 times a day as needed., Disp: , Rfl:     calcium carbonate-vit D3-min 600 mg calcium- 400 unit tablet, Take 1 tablet by mouth 2 times a day., Disp: , Rfl:     carbidopa-levodopa (Sinemet)  mg tablet, Take 3 tablets by mouth 3 times a day. (Patient taking differently: Take 3 tablets by mouth 2 times a day.), Disp: 810 tablet, Rfl: 3    carboxymethylcellulose (Refresh Tears) 0.5 % ophthalmic solution, Administer 1 drop into affected eye(s) 4 times a day., Disp: , Rfl:     cefdinir (Omnicef) 300 mg capsule, TAKE ONE CAPSULE BY MOUTH TWICE DAILY FOR 10 DAYS, Disp: , Rfl:     chlorhexidine (Peridex) 0.12 % solution, 1 TABLESPOON SWISH IN MOUTH FOR 30 SECONDS TWO TIMES DAILY, Disp: , Rfl:     cholecalciferol (Vitamin D-3) 50,000 unit capsule, TAKE ONE CAPSULE BY MOUTH TWICE A WEEK, Disp: , Rfl:     cloNIDine (Catapres-TTS) 0.3 mg/24 hr patch, apply 1 PATCH topically AS DIRECTED one time -a WEEK, Disp: , Rfl:     diclofenac sodium (Voltaren) 1 % gel gel, , Disp: , Rfl:     diclofenac sodium 1 % kit, Place on the skin., Disp: , Rfl:     docusate sodium (DOK) 100 mg capsule, Take by mouth., Disp: , Rfl:     esomeprazole (NexIUM) 40 mg DR capsule, Take 1 capsule (40 mg) by mouth once daily., Disp: , Rfl:     gabapentin (Neurontin) 100 mg capsule, Take 1 capsule (100 mg) by mouth 3 times a day., Disp: , Rfl:     hydroCHLOROthiazide  (HYDRODiuril) 25 mg tablet, Take 1 tablet (25 mg) by mouth once daily., Disp: , Rfl:     ibandronate (Boniva) 150 mg tablet, take one tablet by monthly in the morning with glass of water on empty stomach. do not lay or sit in 30 minutes, Disp: , Rfl:     labetalol (Normodyne) 300 mg tablet, Take 1 tablet (300 mg) by mouth 2 times a day., Disp: , Rfl:     levothyroxine (Synthroid, Levoxyl) 50 mcg tablet, Take 1 tablet (50 mcg) by mouth once daily., Disp: , Rfl:     levothyroxine (Synthroid, Levoxyl) 75 mcg tablet, Take 1 tablet (75 mcg) by mouth once daily., Disp: , Rfl:     lidocaine (Lidoderm) 5 % patch, USE AS DIRECTED PER PACKAGE INSTRUCTIONS. DO NOT USE MORE THAN 1 PATCH EVERY 12 TO 24 HOURS., Disp: , Rfl:     losartan (Cozaar) 50 mg tablet, Take by mouth., Disp: , Rfl:     losartan-hydrochlorothiazide (Hyzaar) 100-25 mg tablet, Take by mouth., Disp: , Rfl:     meclizine (Antivert) 12.5 mg tablet, Take 1 tablet (12.5 mg) by mouth 3 times a day., Disp: , Rfl:     melatonin 5 mg tablet, Take by mouth once daily., Disp: , Rfl:     mirtazapine (Remeron Sol-Tab) 15 mg disintegrating tablet, Take 1 tablet (15 mg) by mouth once daily at bedtime., Disp: , Rfl:     mirtazapine (Remeron) 7.5 mg tablet, Take 1 tablet (7.5 mg) by mouth once daily at bedtime., Disp: , Rfl:     Mucus Relief  mg 12 hr tablet, Take 1 tablet (600 mg) by mouth 2 times a day., Disp: , Rfl:     nitrofurantoin, macrocrystal-monohydrate, (Macrobid) 100 mg capsule, Take by mouth., Disp: , Rfl:     olmesartan-hydrochlorothiazide (Benicar HCT) 40-25 mg tablet, Take 1 tablet by mouth once daily., Disp: , Rfl:     Oyster Shell Calcium-Vit D3 500 mg-5 mcg (200 unit) tablet, Take 1 tablet by mouth 2 times a day., Disp: , Rfl:     polyethylene glycol (Glycolax, Miralax) 17 gram/dose powder, Dissolve dose in 4 - 8 ounces of liquid and take as directed., Disp: , Rfl:     polyvinyl alcohol (Liquifilm Tears) 1.4 % ophthalmic solution, Administer into  affected eye(s)., Disp: , Rfl:     pregabalin (Lyrica) 50 mg capsule, Take 1 capsule (50 mg) by mouth 3 times a day., Disp: , Rfl:     QUEtiapine (SEROquel) 25 mg tablet, TAKE 1/2 TABLET BY MOUTH AT BEDTIME - if WELL tolerated MAY increase TO 1 tablet AT BEDTIME, Disp: , Rfl:     sennosides (Senokot) 8.6 mg tablet, Take 1 tablet (8.6 mg) by mouth if needed., Disp: , Rfl:     Stool Softener-Stimulant Laxat 8.6-50 mg tablet, Take 1 tablet by mouth 2 times a day., Disp: , Rfl:     terbinafine (LamISIL) 250 mg tablet, Take 1 tablet (250 mg) by mouth once daily., Disp: , Rfl:     traMADol (Ultram) 50 mg tablet, Take by mouth every 8 hours., Disp: , Rfl:     traZODone (Desyrel) 50 mg tablet, Take 1-2 tablets ( mg) by mouth as needed at bedtime., Disp: , Rfl:     trihexyphenidyl (Artane) 2 mg tablet, TAKE ONE TABLET BY MOUTH once daily EVERY MORNING, Disp: , Rfl:     valsartan (Diovan) 320 mg tablet, Take 1 tablet (320 mg) by mouth once daily., Disp: , Rfl:     venlafaxine XR (Effexor-XR) 150 mg 24 hr capsule, Take 1 capsule (150 mg) by mouth once daily., Disp: , Rfl:     vortioxetine (Trintellix) 10 mg tablet tablet, Take by mouth., Disp: , Rfl:     zolpidem (Ambien) 10 mg tablet, Take by mouth., Disp: , Rfl:        Objective   There were no vitals filed for this visit.                  Physical Exam  Oriented to time, place, person  Unable to fully assess d/t pain, she is moaning, she has paratonia in all limbs, needs assist to get from table to wheelchair        Assessment/Plan   Ms. Jenise Castorena is a 84 y.o. F with Afib, seen in follow up for parkinsonism (PD vs. medication-induced parkinsonism vs vascular parkinsonism vs mixed). Exam is difficult today as she is having a lot of back pain, laying on exam table, moaning, due for injection with pain management and being up in wheelchair exacerbated pain (usually lays in bed all day without issue per ). She was unable to tolerate higher doses of  "Sinemet d/t mouth dyskinesia and also falls at QID dosing, now tolerating current dose without dyskinesia. Patient does not want to adjust meds at this time as back pain is her main issue.     Last visit noted some improvement in bradykinesia, postural stability and gait.  She has not had Catarino scan before, but Carbidopa-Levodopa seems to be helping her, suggesting possible iPD. MRI brain from 2022 reviewed by Dr. Bond and he noted \"she has some white matter disease but no basal ganglia lesions\"    PLAN       There are no diagnoses linked to this encounter.            Jomar Austin, NP-C  Adult/Gerontological Nurse Practitioner   [facility], Department of Neurology  [facility] [place]  [facility]  [address] [place] [address]  Phone: [phone]  Fax: [phone]  "

## 2024-12-26 DIAGNOSIS — G20.A1 PARKINSON'S DISEASE, UNSPECIFIED WHETHER DYSKINESIA PRESENT, UNSPECIFIED WHETHER MANIFESTATIONS FLUCTUATE: ICD-10-CM

## 2024-12-26 RX ORDER — CARBIDOPA AND LEVODOPA 25; 100 MG/1; MG/1
2 TABLET ORAL 3 TIMES DAILY
Qty: 540 TABLET | Refills: 1 | Status: SHIPPED | OUTPATIENT
Start: 2024-12-26 | End: 2025-06-24

## 2025-01-10 ENCOUNTER — APPOINTMENT (OUTPATIENT)
Dept: NEUROLOGY | Facility: CLINIC | Age: 85
End: 2025-01-10
Payer: COMMERCIAL

## 2025-01-10 DIAGNOSIS — G20.C PARKINSONISM, UNSPECIFIED PARKINSONISM TYPE (MULTI): Primary | ICD-10-CM

## 2025-01-10 DIAGNOSIS — M54.9 CHRONIC BACK PAIN, UNSPECIFIED BACK LOCATION, UNSPECIFIED BACK PAIN LATERALITY: ICD-10-CM

## 2025-01-10 DIAGNOSIS — G89.29 CHRONIC BACK PAIN, UNSPECIFIED BACK LOCATION, UNSPECIFIED BACK PAIN LATERALITY: ICD-10-CM

## 2025-01-10 PROCEDURE — 1160F RVW MEDS BY RX/DR IN RCRD: CPT | Performed by: NURSE PRACTITIONER

## 2025-01-10 PROCEDURE — 1159F MED LIST DOCD IN RCRD: CPT | Performed by: NURSE PRACTITIONER

## 2025-01-10 PROCEDURE — 99214 OFFICE O/P EST MOD 30 MIN: CPT | Performed by: NURSE PRACTITIONER

## 2025-01-10 PROCEDURE — 1036F TOBACCO NON-USER: CPT | Performed by: NURSE PRACTITIONER

## 2025-01-10 RX ORDER — PRIMIDONE 50 MG/1
25 TABLET ORAL 2 TIMES DAILY
COMMUNITY
End: 2025-01-10 | Stop reason: SDUPTHER

## 2025-01-10 RX ORDER — PRIMIDONE 50 MG/1
25 TABLET ORAL 2 TIMES DAILY
Qty: 90 TABLET | Refills: 3 | Status: SHIPPED | OUTPATIENT
Start: 2025-01-10

## 2025-01-10 RX ORDER — GABAPENTIN 100 MG/1
100 CAPSULE ORAL 3 TIMES DAILY
Qty: 270 CAPSULE | Refills: 3 | Status: SHIPPED | OUTPATIENT
Start: 2025-01-10

## 2025-01-10 RX ORDER — GABAPENTIN 100 MG/1
100 CAPSULE ORAL 3 TIMES DAILY
Qty: 30 CAPSULE | Refills: 2 | Status: CANCELLED | OUTPATIENT
Start: 2025-01-10 | End: 2026-01-10

## 2025-01-10 RX ORDER — LISINOPRIL 40 MG/1
40 TABLET ORAL DAILY
COMMUNITY

## 2025-01-10 RX ORDER — CARBIDOPA AND LEVODOPA 25; 100 MG/1; MG/1
2 TABLET ORAL 3 TIMES DAILY
Qty: 540 TABLET | Refills: 3 | Status: SHIPPED | OUTPATIENT
Start: 2025-01-10

## 2025-01-10 NOTE — PATIENT INSTRUCTIONS
#Restart gabapentin 100mg 3 times a day for chronic back pain    Let me know if any side effect (sedation, dizziness, swelling of legs, confusion)    #Continue   Med Dose   Carbidopa-levodopa 25/100mg 2 tabs 3 times a day   Primidone 50mg 0.5tab 2 times a day       #If tremor worsens or stiffness or slowness worsen or become bothersome, we can change to Rytary (so hopefully more benefit with less mouth movement), as we discussed. Dose would need monitored and adjusted with change.    #6M follow up with Dr. Bond, see me sooner if needed (OK for virtual)    .stewart

## 2025-01-10 NOTE — PROGRESS NOTES
Subjective     Jenise Castorena is a 84 y.o. year old female who presents with Parkinson's Disease, here for follow up visit.    A video visit between the patient (at the originating site) and the provider (at the distant site) was utilized to provide this telehealth service.     Verbal consent was requested and obtained from the patient on this date for a telehealth visit. The patient was informed about the telehealth clinical encounter including benefits to avoiding travel, limitations to the assessment, and billing for the service. In person care may be recommended if needed. Telehealth sessions are not being recorded and personal health information is protected. All questions were answered and verbal informal consent was obtained from the patient to proceed.    HPI  Comes with  Asmita who translates. Caregiver is present and nurse also available on phone to review meds.    In the hospital 10/27/25 for acute hypoxemic respiratory failure in setting of aspiration pneumonia, sepsis--was on the vent  Afib and anticoag deffered d/t melena as well as falls risk. Also had melena, needs GA for EGD and son declined  d/c home with [facility], has 2 aids who comes TID, everyday.   She is weaker since then.     Has PCP who visits in the home now.    Gabapentin 100mg TID was stopped in the Summer for delerium but has chronic back pain and wants to resume. Yells out when she has to move (was doing this last visit too)  Last filed in August      MOTOR SYMPTOMS      +/-                            Comments  Motor sx overall     Tremor + LUE tremor and Sinemet helps as well as primidone   Rigidity + LUE stiff/unable to move since hand surgery    Bradykinesia +    Balance/gait + Mostly bed bound, only up in chair for 30mins   Falls -    PT + Walks w PT   Exercise -      NON MOTOR SYMPTOMS       +/-                               Comments  Orthostatic lightheadedness  - 6/30/24 admitted for syncope  No further syncope--before  when having a BM but not for months, no dizziness   Cognitive  + Always had a good memor   Insomnia + She says she is not sleeping at all but she sleeps often when aid is checking on her   RBD     Depression/mood + Still seeing psychiatry and stable   Hypophonia +    Dysphagia + Aspiration pna in June, no special diet now, was on Corpack and did testing and passed it  Doing well currently   Constipation + Miralax, Senna Plus  BM daily   Urinary dysfunction + IC sometimes        Social  Lives with: alone, aid 2x/day daily  Help with ADLs: Needs assist bathing, dressing, nurse does meds qweek           [facility] Meds  Med Dose Time   Carbidopa-levodopa 25/100mg 2.5tabs TID and had lip smacking/dyskinesia, decreased to 2 tabs TID  8am-2pm-7pm   Primidone 50mg 0.5tab BID     Gabapentin 100mg TID----had been stopped    Latency     Wearing off Feels she is doing well    Side effects     Dyskinesia Used to have but now much better--very minor of the mouth    Hallucinations None    Impulse control None    Sudden sleep None    Other None      Prior medications:  Artane- worsened TD/mouth movements   Primidone  Pramipexole    Pertinent testing:                   Current Outpatient Medications:     acetaminophen (Tylenol) 500 mg tablet, Take 1 tablet (500 mg) by mouth every 8 hours if needed., Disp: , Rfl:     acetaminophen-codeine (Tylenol w/ Codeine #3) 300-30 mg tablet, Take by mouth., Disp: , Rfl:     albuterol 90 mcg/actuation inhaler, inhale TWO puffs as instructed EVERY 6 HOURS AS NEEDED, Disp: , Rfl:     alprazolam (XANAX ORAL), Take by mouth., Disp: , Rfl:     ammonium lactate (Amlactin) 12 % cream, Apply topically., Disp: , Rfl:     Artificial Tears,pvalch-povid, 0.5-0.6 % ophthalmic solution, INSTILL 1 DROP IN EACH EYE FOUR TIMES DAILY, Disp: , Rfl:     atorvastatin (Lipitor) 40 mg tablet, Take 1 tablet (40 mg) by mouth once daily., Disp: , Rfl:     benzonatate (Tessalon) 100 mg capsule, Take 1  capsule (100 mg) by mouth 3 times a day as needed., Disp: , Rfl:     calcium carbonate-vit D3-min 600 mg calcium- 400 unit tablet, Take 1 tablet by mouth 2 times a day., Disp: , Rfl:     carbidopa-levodopa (Sinemet)  mg tablet, Take 2 tablets by mouth 3 times a day., Disp: 540 tablet, Rfl: 3    carboxymethylcellulose (Refresh Tears) 0.5 % ophthalmic solution, Administer 1 drop into affected eye(s) 4 times a day., Disp: , Rfl:     cefdinir (Omnicef) 300 mg capsule, TAKE ONE CAPSULE BY MOUTH TWICE DAILY FOR 10 DAYS, Disp: , Rfl:     chlorhexidine (Peridex) 0.12 % solution, 1 TABLESPOON SWISH IN MOUTH FOR 30 SECONDS TWO TIMES DAILY, Disp: , Rfl:     cholecalciferol (Vitamin D-3) 50,000 unit capsule, TAKE ONE CAPSULE BY MOUTH TWICE A WEEK, Disp: , Rfl:     diclofenac sodium (Voltaren) 1 % gel gel, , Disp: , Rfl:     diclofenac sodium 1 % kit, Place on the skin., Disp: , Rfl:     docusate sodium (DOK) 100 mg capsule, Take by mouth., Disp: , Rfl:     esomeprazole (NexIUM) 40 mg DR capsule, Take 1 capsule (40 mg) by mouth once daily., Disp: , Rfl:     gabapentin (Neurontin) 100 mg capsule, Take 1 capsule (100 mg) by mouth 3 times a day., Disp: 270 capsule, Rfl: 3    labetalol (Normodyne) 300 mg tablet, Take 1 tablet (300 mg) by mouth 2 times a day., Disp: , Rfl:     levothyroxine (Synthroid, Levoxyl) 75 mcg tablet, Take 1 tablet (75 mcg) by mouth once daily., Disp: , Rfl:     lidocaine (Lidoderm) 5 % patch, USE AS DIRECTED PER PACKAGE INSTRUCTIONS. DO NOT USE MORE THAN 1 PATCH EVERY 12 TO 24 HOURS., Disp: , Rfl:     lisinopril 40 mg tablet, Take 1 tablet (40 mg) by mouth once daily., Disp: , Rfl:     melatonin 5 mg tablet, Take by mouth once daily., Disp: , Rfl:     Mucus Relief  mg 12 hr tablet, Take 1 tablet (600 mg) by mouth 2 times a day., Disp: , Rfl:     nitrofurantoin, macrocrystal-monohydrate, (Macrobid) 100 mg capsule, Take by mouth., Disp: , Rfl:     Oyster Shell Calcium-Vit D3 500 mg-5 mcg (200 unit)  "tablet, Take 1 tablet by mouth 2 times a day., Disp: , Rfl:     polyethylene glycol (Glycolax, Miralax) 17 gram/dose powder, Dissolve dose in 4 - 8 ounces of liquid and take as directed., Disp: , Rfl:     polyvinyl alcohol (Liquifilm Tears) 1.4 % ophthalmic solution, Administer into affected eye(s)., Disp: , Rfl:     primidone (Mysoline) 50 mg tablet, Take 0.5 tablets (25 mg) by mouth 2 times a day., Disp: 90 tablet, Rfl: 3    QUEtiapine (SEROquel) 25 mg tablet, TAKE 1/2 TABLET BY MOUTH AT BEDTIME - if WELL tolerated MAY increase TO 1 tablet AT BEDTIME, Disp: , Rfl:     sennosides (Senokot) 8.6 mg tablet, Take 1 tablet (8.6 mg) by mouth if needed., Disp: , Rfl:     Stool Softener-Stimulant Laxat 8.6-50 mg tablet, Take 1 tablet by mouth 2 times a day., Disp: , Rfl:     terbinafine (LamISIL) 250 mg tablet, Take 1 tablet (250 mg) by mouth once daily., Disp: , Rfl:        Objective   There were no vitals filed for this visit.                  Physical Exam  Sitting up in chair  Awake and alert  RUE bradykinesia 1+  Chronic weakness and L hand frx-unable to do anything with it--resting tremor in LUE is 1+/intermittent  Stands quickly by pushing off chair, stooped posture 2+, walks with roller walker    Assessment/Plan   Ms. Jenise Castorena is a 84 y.o. F with Afib, seen in follow up for parkinsonism (PD vs. medication-induced parkinsonism vs vascular parkinsonism vs mixed). Overall she, , nurse all report she is doing well in regards to PD sx. She was unable to tolerate higher doses of Sinemet d/t mouth dyskinesia, currently very mild mouth dyskinesia. Will continue meds unchanged, she is receiving PT.   She has noted some improvement in bradykinesia, postural stability and gait.  She has not had Catarino scan before, but Carbidopa-Levodopa seems to be helping her, suggesting possible iPD. MRI brain from 2022 reviewed by Dr. Bond and he noted \"she has some white matter disease but no basal ganglia " "lesions\"    Chronic back pain, she has difficulty moving due to pain which is a chronic issue, previously on gabapentin which helped, was low dose, will restart. Previously seen by pain management, had injections, however, difficulty getting to apts. PCP now comes to her home.     PLAN  Restart gabapentin, helped back pain previously--last filled in August, previously stopped for delirium when hospitalized in May  Continue Sinemet and primidone unchanged  Rytary next if needed   Continue PT     Diagnoses and all orders for this visit:  Parkinsonism, unspecified Parkinsonism type (Multi)  -     carbidopa-levodopa (Sinemet)  mg tablet; Take 2 tablets by mouth 3 times a day.  -     primidone (Mysoline) 50 mg tablet; Take 0.5 tablets (25 mg) by mouth 2 times a day.  Chronic back pain, unspecified back location, unspecified back pain laterality  -     gabapentin (Neurontin) 100 mg capsule; Take 1 capsule (100 mg) by mouth 3 times a day.      #Restart gabapentin 100mg 3 times a day for chronic back pain    Let me know if any side effect (sedation, dizziness, swelling of legs, confusion)    #Continue   Med Dose   Carbidopa-levodopa 25/100mg 2 tabs 3 times a day   Primidone 50mg 0.5tab 2 times a day       #If tremor worsens or stiffness or slowness worsen or become bothersome, we can change to Rytary (so hopefully more benefit with less mouth movement), as we discussed. Dose would need monitored and adjusted with change.    #6M follow up with Dr. Bond, see me sooner if needed (OK for virtual)      Jomar Austin, NP-C  Adult/Gerontological Nurse Practitioner   [facility], Department of Neurology  [facility] [place]  [facility]  [id number] [place], [place] [id number]  Phone: [phone]  Fax: [phone]  "